# Patient Record
(demographics unavailable — no encounter records)

---

## 2025-02-01 NOTE — ASSESSMENT
[FreeTextEntry1] : MARY SIERRA is a 58 year old female presenting with lower back pain that radiates down the RLE posteriorly. Has received LESI 3 weeks ago with some relief. HNP can be seen L5/S1. Discussed another inj. Discussed surgery if sxs don't improve from PT.   Lumbar radiculopathy  PT Ibuprofen 600 mg f/u prn

## 2025-02-01 NOTE — IMAGING
[de-identified] : LSPINE Inspection: No rash or ecchymosis Palpation: No tenderness to palpation or spasm in bilateral thoracic and lumbar paraspinal musculature, no SI joint tenderness to palpation ROM: Full with no pain Strength: 5/5 bilateral hip flexors, knee extensors, ankle dorsiflexors, EHL, ankle plantarflexors Sensation: Sensation present to light touch bilateral L2-S1 distributions Provocative maneuvers: Negative bilateral straight leg raise  heel and gait intact

## 2025-02-01 NOTE — DATA REVIEWED
[MRI] : MRI [Bilateral] : of the bilateral [Lumbar Spine] : lumbar spine [Report was reviewed and noted in the chart] : The report was reviewed and noted in the chart [I independently reviewed and interpreted images and report] : I independently reviewed and interpreted images and report [FreeTextEntry1] : L5/S1 HNP

## 2025-02-01 NOTE — HISTORY OF PRESENT ILLNESS
[de-identified] : 02/01/25  NP lumbar pain possible injured when she lifted heavy object 12/15/24 she was seen  by a  chiropractor who sent her for MRI. Patient has been taking Naproxen and Motrin which helps slightly, pain radiates to right leg. Has issues with sitting and is more comfortable standing or laying down. Has had LESI 3 weeks ago that gave minimal relief. Pain travels posteriorly RLE. Has brought an MRI Cesilia (12/30/24) to be reviewed.

## 2025-03-31 NOTE — IMAGING
[Straightening consistent with spasm] : Straightening consistent with spasm [Disc space narrowing] : Disc space narrowing [Scoliosis] : Scoliosis [AP] : anteroposterior [There are no fractures, subluxations or dislocations. No significant abnormalities are seen] : There are no fractures, subluxations or dislocations. No significant abnormalities are seen

## 2025-03-31 NOTE — DATA REVIEWED
[MRI] : MRI [Bilateral] : of the bilateral [Lumbar Spine] : lumbar spine [Report was reviewed and noted in the chart] : The report was reviewed and noted in the chart [I independently reviewed and interpreted images and report] : I independently reviewed and interpreted images and report

## 2025-05-27 NOTE — PHYSICAL EXAM
[Normal Coordination] : normal coordination [Normal Mood and Affect] : normal mood and affect [Oriented] : oriented [Able to Communicate] : able to communicate [No obvious lymphadenopathy in areas examined] : no obvious lymphadenopathy in areas examined [Well Developed] : well developed

## 2025-06-02 NOTE — IMAGING
[Straightening consistent with spasm] : Straightening consistent with spasm [Disc space narrowing] : Disc space narrowing [Scoliosis] : Scoliosis [AP] : anteroposterior [There are no fractures, subluxations or dislocations. No significant abnormalities are seen] : There are no fractures, subluxations or dislocations. No significant abnormalities are seen [de-identified] : L Spine Inspection: No defects or deformities Palpation: Spasms in R lumbar paraspinal musculature ROM: diminished due to spasm Strength: right gastroc and anmkle DF 4/5; remainder is 5/5 on the right side LLE 5/5 throughout Neuro: Sensation LT I LLE, diminished to LT RLE in L45 dermtomes  + right SLR Toe and heal walk is weak with RLE; 4/5 Gait antalgic

## 2025-06-02 NOTE — IMAGING
[Straightening consistent with spasm] : Straightening consistent with spasm [Disc space narrowing] : Disc space narrowing [Scoliosis] : Scoliosis [AP] : anteroposterior [There are no fractures, subluxations or dislocations. No significant abnormalities are seen] : There are no fractures, subluxations or dislocations. No significant abnormalities are seen [de-identified] : L Spine Inspection: No defects or deformities Palpation: Spasms in R lumbar paraspinal musculature ROM: diminished due to spasm Strength: right gastroc and anmkle DF 4/5; remainder is 5/5 on the right side LLE 5/5 throughout Neuro: Sensation LT I LLE, diminished to LT RLE in L45 dermtomes  + right SLR Toe and heal walk is weak with RLE; 4/5 Gait antalgic

## 2025-06-02 NOTE — DATA REVIEWED
[MRI] : MRI [Bilateral] : of the bilateral [Lumbar Spine] : lumbar spine [Report was reviewed and noted in the chart] : The report was reviewed and noted in the chart [I independently reviewed and interpreted images and report] : I independently reviewed and interpreted images and report [FreeTextEntry1] : very large right sided 5-1 disc herniation that clearly displaces the right S1 root;  there is a central, symmetric L45 disc herniation with equal degree of lateral recess narrowing;

## 2025-06-02 NOTE — HISTORY OF PRESENT ILLNESS
[de-identified] : 5/27/25: Follow up L Spine. Here for surgical discussion.   3/31/25: Follow up [facility] Sp[facility]. Had LESI'sx2 since last visit w/o much relief. Started PT, completed 9 sessions. Remains with right buttocks into upper posterior thigh. Taking Ibuprofen & Naproxen PRN.   02/01/25  NP lumbar pain possible injured when she lifted heavy object 12/15/24 she was seen  by a  chiropractor who sent her for MRI. Patient has been taking Naproxen and Motrin which helps slightly, pain radiates to right leg. Has issues with sitting and is more comfortable standing or laying down. Has had LESI 3 weeks ago that gave minimal relief. Pain travels posteriorly RLE. Has brought an MRI Cesilia (12/30/24) to be reviewed.

## 2025-06-02 NOTE — ASSESSMENT
[FreeTextEntry1] : Cancelled the surgery shortly before it was scheduled to be done since her pain had begun to improve a great deal.   BUT the relief turns out to have only been short lived.  She has opted to undergo the surgery, after all.  No need for a new MRI since the symptoms are essentially the same qualitatively and quantitatively.   57 y/o F with chronic lower back pain and RLE radic after heavy lifting on 12/15/24. MRI: large right sided L5-S1 HNP compressing & displacing S1 root; L45 central to left sided HNP. Exam consistent with S1 dermatome of pain, mild diffuse RLE weakness dt severe pain. Received LESI's x 2 & completed extensive PT w/o longstanding satisfactory relief.   - Reviewed surgical options: plan for L5-S1 discectomy.  - The HNP is presumed sole pain generator; I do not think the L45 disc herniation is a factor here.  - Haley agrees with plan and would like to proceed with sx.   The risks and benefits of surgery have been discussed. Risks include but are not limited to bleeding, infection, reaction to anesthesia, injury to blood vessels and nerves, malunion, nonunion, DVT, PE, necessity of repeat surgery, CF leak/repair, chronic pain, loss of limb and death. The patient understands the risks and agrees with the surgical plan. All questions have been answered.

## 2025-06-02 NOTE — HISTORY OF PRESENT ILLNESS
[de-identified] : 5/27/25: Follow up L Spine. Here for surgical discussion.   3/31/25: Follow up [facility] Sp[facility]. Had LESI'sx2 since last visit w/o much relief. Started PT, completed 9 sessions. Remains with right buttocks into upper posterior thigh. Taking Ibuprofen & Naproxen PRN.   02/01/25  NP lumbar pain possible injured when she lifted heavy object 12/15/24 she was seen  by a  chiropractor who sent her for MRI. Patient has been taking Naproxen and Motrin which helps slightly, pain radiates to right leg. Has issues with sitting and is more comfortable standing or laying down. Has had LESI 3 weeks ago that gave minimal relief. Pain travels posteriorly RLE. Has brought an MRI Cesilia (12/30/24) to be reviewed.

## 2025-07-17 NOTE — HISTORY OF PRESENT ILLNESS
[de-identified] : DOS: 6/6/25 L5-S1 R mini open diskectomy.   7/15/25: PO#2. 5 weeks s/p L5-S1 discectomy. Continues with some RLE pain, however, significantly improved from preop. Started PT.   6/17/25: PO#1. 11 days s/p L5-S1 R mini open discectomy. Doing okay. continues having some numbness in R heel. Gaurav f/c/s.   5/27/25: Follow up L Spine. Here for surgical discussion.   3/31/25: Follow up [facility]. Had LESI'sx2 since last visit w/o much relief. Started PT, completed 9 sessions. Remains with right buttocks into upper posterior thigh. Taking Ibuprofen & Naproxen PRN.   02/01/25  NP lumbar pain possible injured when she lifted heavy object 12/15/24 she was seen  by a  chiropractor who sent her for MRI. Patient has been taking Naproxen and Motrin which helps slightly, pain radiates to right leg. Has issues with sitting and is more comfortable standing or laying down. Has had LESI 3 weeks ago that gave minimal relief. Pain travels posteriorly RLE. Has brought an MRI Cesilia (12/30/24) to be reviewed. [FreeTextEntry5] : still has some pain in the right leg and gets it often. was supposed to get Percocet but never received it but takes tizanidine

## 2025-07-17 NOTE — PHYSICAL EXAM
[de-identified] : L Spine Inspection: Incision clean Palpation: No tenderness or spasms in b/l lumbar paraspinal musculature ROM: Full with mild stiffness Strength: 5/5 b/l hip flexors, knee extensors, ankle dorsiflexors, EHL, ankle plantarflexors Neuro: Sensation LT I Negative b/l SLR Toe and heal walk intact Gait non antalgic

## 2025-07-17 NOTE — HISTORY OF PRESENT ILLNESS
[de-identified] : DOS: 6/6/25 L5-S1 R mini open diskectomy.   7/15/25: PO#2. 5 weeks s/p L5-S1 discectomy. Continues with some RLE pain, however, significantly improved from preop. Started PT.   6/17/25: PO#1. 11 days s/p L5-S1 R mini open discectomy. Doing okay. continues having some numbness in R heel. Gaurav f/c/s.   5/27/25: Follow up L Spine. Here for surgical discussion.   3/31/25: Follow up [facility]. Had LESI'sx2 since last visit w/o much relief. Started PT, completed 9 sessions. Remains with right buttocks into upper posterior thigh. Taking Ibuprofen & Naproxen PRN.   02/01/25  NP lumbar pain possible injured when she lifted heavy object 12/15/24 she was seen  by a  chiropractor who sent her for MRI. Patient has been taking Naproxen and Motrin which helps slightly, pain radiates to right leg. Has issues with sitting and is more comfortable standing or laying down. Has had LESI 3 weeks ago that gave minimal relief. Pain travels posteriorly RLE. Has brought an MRI Cesilia (12/30/24) to be reviewed. [FreeTextEntry5] : still has some pain in the right leg and gets it often. was supposed to get Percocet but never received it but takes tizanidine

## 2025-07-17 NOTE — ASSESSMENT
[FreeTextEntry1] : PO#2. 5 weeks s/p R L5-S1 mini open discectomy. Incision clean. Remains with some RLE pain, however, much improved from preop. No weakness. C/w PT. F/up in 4 weeks.

## 2025-07-17 NOTE — PHYSICAL EXAM
[de-identified] : L Spine Inspection: Incision clean Palpation: No tenderness or spasms in b/l lumbar paraspinal musculature ROM: Full with mild stiffness Strength: 5/5 b/l hip flexors, knee extensors, ankle dorsiflexors, EHL, ankle plantarflexors Neuro: Sensation LT I Negative b/l SLR Toe and heal walk intact Gait non antalgic